# Patient Record
Sex: MALE | Race: WHITE
[De-identification: names, ages, dates, MRNs, and addresses within clinical notes are randomized per-mention and may not be internally consistent; named-entity substitution may affect disease eponyms.]

---

## 2021-01-02 ENCOUNTER — HOSPITAL ENCOUNTER (EMERGENCY)
Dept: HOSPITAL 97 - ER | Age: 71
Discharge: LEFT BEFORE DISCHARGE | End: 2021-01-02
Payer: COMMERCIAL

## 2021-01-02 VITALS — SYSTOLIC BLOOD PRESSURE: 145 MMHG | OXYGEN SATURATION: 99 % | TEMPERATURE: 98.3 F | DIASTOLIC BLOOD PRESSURE: 66 MMHG
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
## 2021-01-02 DIAGNOSIS — M62.82: ICD-10-CM

## 2021-01-02 DIAGNOSIS — F17.290: ICD-10-CM

## 2021-01-02 DIAGNOSIS — Z23: ICD-10-CM

## 2021-01-02 DIAGNOSIS — S01.111A: Primary | ICD-10-CM
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
## 2021-01-02 DIAGNOSIS — S43.004A: ICD-10-CM

## 2021-01-02 DIAGNOSIS — F10.10: ICD-10-CM
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
## 2021-01-02 DIAGNOSIS — I10: ICD-10-CM

## 2021-01-02 LAB
ALBUMIN SERPL BCP-MCNC: 4.2 G/DL (ref 3.4–5)
ALP SERPL-CCNC: 81 U/L (ref 45–117)
ALT SERPL W P-5'-P-CCNC: 36 U/L (ref 12–78)
AST SERPL W P-5'-P-CCNC: 37 U/L (ref 15–37)
BLD SMEAR INTERP: (no result)
BUN BLD-MCNC: 12 MG/DL (ref 7–18)
GLUCOSE SERPLBLD-MCNC: 222 MG/DL (ref 74–106)
HCT VFR BLD CALC: 42.1 % (ref 39.6–49)
INR BLD: 1.13
LYMPHOCYTES # SPEC AUTO: 0.4 K/UL (ref 0.7–4.9)
MAGNESIUM SERPL-MCNC: 1.9 MG/DL (ref 1.8–2.4)
MORPHOLOGY BLD-IMP: (no result)
NT-PROBNP SERPL-MCNC: 60 PG/ML (ref ?–125)
PMV BLD: 9.4 FL (ref 7.6–11.3)
POTASSIUM SERPL-SCNC: 3.6 MMOL/L (ref 3.5–5.1)
RBC # BLD: 4.66 M/UL (ref 4.33–5.43)
TROPONIN (EMERG DEPT USE ONLY): < 0.02 NG/ML (ref 0–0.04)

## 2021-01-02 PROCEDURE — 99284 EMERGENCY DEPT VISIT MOD MDM: CPT

## 2021-01-02 PROCEDURE — 36415 COLL VENOUS BLD VENIPUNCTURE: CPT

## 2021-01-02 PROCEDURE — 85610 PROTHROMBIN TIME: CPT

## 2021-01-02 PROCEDURE — 73060 X-RAY EXAM OF HUMERUS: CPT

## 2021-01-02 PROCEDURE — 93005 ELECTROCARDIOGRAM TRACING: CPT

## 2021-01-02 PROCEDURE — 96360 HYDRATION IV INFUSION INIT: CPT

## 2021-01-02 PROCEDURE — 80048 BASIC METABOLIC PNL TOTAL CA: CPT

## 2021-01-02 PROCEDURE — 83735 ASSAY OF MAGNESIUM: CPT

## 2021-01-02 PROCEDURE — 12013 RPR F/E/E/N/L/M 2.6-5.0 CM: CPT

## 2021-01-02 PROCEDURE — 70450 CT HEAD/BRAIN W/O DYE: CPT

## 2021-01-02 PROCEDURE — 80076 HEPATIC FUNCTION PANEL: CPT

## 2021-01-02 PROCEDURE — 73030 X-RAY EXAM OF SHOULDER: CPT

## 2021-01-02 PROCEDURE — 80320 DRUG SCREEN QUANTALCOHOLS: CPT

## 2021-01-02 PROCEDURE — 83880 ASSAY OF NATRIURETIC PEPTIDE: CPT

## 2021-01-02 PROCEDURE — 96361 HYDRATE IV INFUSION ADD-ON: CPT

## 2021-01-02 PROCEDURE — 84484 ASSAY OF TROPONIN QUANT: CPT

## 2021-01-02 PROCEDURE — 71045 X-RAY EXAM CHEST 1 VIEW: CPT

## 2021-01-02 PROCEDURE — 0JQ10ZZ REPAIR FACE SUBCUTANEOUS TISSUE AND FASCIA, OPEN APPROACH: ICD-10-PCS

## 2021-01-02 PROCEDURE — 85025 COMPLETE CBC W/AUTO DIFF WBC: CPT

## 2021-01-02 PROCEDURE — 82550 ASSAY OF CK (CPK): CPT

## 2021-01-02 NOTE — RAD REPORT
EXAM DESCRIPTION:  RAD - Shoulder  Right 2 View - 1/2/2021 12:43 pm

 

CLINICAL HISTORY:  Right shoulder pain

 

FINDINGS:  Anterior right humeral dislocation.

 

No fracture is seen

## 2021-01-02 NOTE — XMS REPORT
Clinical Summary

                           Created on:2021



Patient:Ramírez Raza

Sex:Male

:1950

External Reference #:[id number]





Demographics







                          Address                   [address]



                                                    [place] [address]

 

                          Home Phone                [phone]

 

                          Preferred Language        English

 

                          Marital Status            Unknown

 

                          Islam Affiliation     Unknown

 

                          Race                      White

 

                          Ethnic Group              Not  or 









Author







                          Organization              [facility]

 

                          Address                   [address] [place] [address]









Support







                Name            Relationship    Address         Phone

 

                Adriana Raza  Unavailable     [address] [phone]



                                                [place] [address] 









Care Team Providers







                    Name                Role                Phone

 

                    Luis Thrasher MD Primary Care Provider [phone]









Allergies

No Known Allergies



Medications







          Medication Sig       Dispensed Refills   Start Date End Date  Status

 

          SITagliptin-metFORMIN Take 1 tablet by           0                    

         Active



          (JANUMET)  mg mouth 2 (two) times                               

          



          per tablet daily with                                         



                    breakfast and                                         



                    dinner.                                           

 

          losartan (COZAAR) 100 Take 100 mg by           0                      

       Active



          MG tablet mouth daily.                                         

 

          albuterol HFA Inhale 4 puffs by           0                           

  Active



          (VENTOLIN HFA) 90 mouth via inhaler                                   

      



          mcg/actuation inhaler every 6 (six) hours                             

            



                    as needed for                                         



                    Wheezing.                                         

 

          glimepiride (AMARYL) Take 4 mg by mouth           0                   

          Active



          4 MG tablet 2 (two) times daily                                       

  



                    before meals.                                         







Active Problems







                          Problem                   Noted Date

 

                          Back pain                 2017

 

                          Stress                    2017

 

                          ICH (intracerebral hemorrhage) 2017

 

                          Thalamic hemorrhage       2017

 

                          Uncontrolled hypertension 2017

 

                          Type 2 diabetes mellitus  2017







Social History







             Tobacco Use  Types        Packs/Day    Years Used   Date

 

             Former Smoker                                        









                Smokeless Tobacco: Never Used                                 









                Alcohol Use     Drinks/Week     oz/Week         Comments

 

                No                                              









                          Sex Assigned at Birth     Date Recorded

 

                          Not on file               







Last Filed Vital Signs

Not on file



Plan of Treatment

Not on file



Results

Not on fileafter 2020



Insurance







          Payer     Benefit Plan / Subscriber ID Effective Phone     Address   T

ype



                    Group               Dates                         

 

          MEDICARE  MEDICARE A B [id number] 2015-[id number]                     M

edicare



                                        nt                            

 

          BLUE      BCBS INDEMNITY [id number] 2015-Erna [phone] [address]

    [address]



           [place]                 [id number]



                                        



          [place] 



                                                            [id number] 









           Guarantor Name Account Type Relation to Date of Birth Phone      Bill

ing



                                 Patient                          Address

 

           Ramírez Raza Personal/Family Self       1950 [phone]



                                                       (Home)     [place] [address] [place] [address]







Advance Directives

For more information, please contact: [phone]





                Code Status     Date Activated  Date Inactivated Comments

 

                Full Code       2017  4:52 PM 2017  2:25 PM 









                    This code status was determined by: Patient

## 2021-01-02 NOTE — XMS REPORT
Continuity of Care Document

                           Created on:2021



Patient:Ramírez Raza

Sex:Male

:1950

External Reference #:[id number]





Demographics







                          Address                   [address] [place] [address]

 

                          Home Phone                [phone]

 

                          Preferred Language        en-US

 

                          Marital Status            Unknown

 

                          Yarsanism Affiliation     Unknown

 

                          Race                      Unknown

 

                          Ethnic Group              Unknown









Author







                          Organization              [employer]









Care Team Providers







                    Name                Role                Phone

 

                    [employer] Unavailable         Un

available









Problems







          Problem   Status    Onset     Classification Date      Comments  Sourc

e



                              Date                Reported            



                                                                      



                                                                      

 

          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



          tumor of                                                    Mac



          tendon                                                      



          sheath                                                      



                                                                      



                                                                      

 

          Ganglion  Active              Problem   10/05/2016           [facility]na



          cyst of                                                     Mac



          finger of                                                   



          left hand                                                   



                                                                      



                                                                      







Medications







        Medication Details Route   Status  Patient Ordering Order   Source



                                        Instructions Provider Date    



                                                                



                                                                



                                                                

 

        simvastatin 1 tab(s) orally  Active  10 mg orally MAC eRa

[place]



                                        once a day (at                 [place]



                                        bedtime)                 



                                                                



                                                                

 

        Janumet 1 tab(s) orally  Active  500 mg-50 mg MAC Olivat

e



                                        orally 2 times                 Mac



                                        a day                   



                                                                

 

        losartan 1 tab(s) orally  Active  100 mg orally MAC Realo

tte



                                        once a day                 Mac



                                                                



                                                                

 

        glimepiride 1 tab(s) orally  Active  2 mg orally MAC Real

jennifer



                                        once a day                 Mac



                                                                



                                                                

 

        Invokana 1 tab(s) orally  Active  300 mg orally MAC Realo

tte



                                        once a day                 Mac



                                                                



                                                                

 

        Plavix  1 tab(s) orally  Active  75 mg orally MAC Olivat

e



                                        once a day                 Mac



                                                                



                                                                







Allergies, Adverse Reactions, Alerts







        Substance Category Reaction Severity Reaction Status  Date    Comments S

ource



                                        type            Reported         



                                                                        



                                                                        



                                                                        

 

        N.K.D.A. Adverse Info Not         Adverse Active  10/03/201         Rona roberts



                Reaction Available         Reaction         6               Jhonatan

howard



                                                                        



                                                                        



                                                                        







Immunizations







                                        No Data Provided for This Section







Results







                                        No Data Provided for This Section







Pathology Reports







                                        No Data Provided for This Section







Diagnostic Reports







                                        No Data Provided for This Section







Consultation Notes







                                        No Data Provided for This Section







Discharge Summaries







                                        No Data Provided for This Section







History and Physicals







                                        No Data Provided for This Section







Vital Signs







             Vital Sign   Value        Date         Comments     Source



                                                                 

 

             Weight       195          10/03/2016                Edna Overton

nder



                                                                 



                                                                 

 

             Diastolic (mm Hg) 79           10/03/2016                Ednaarmando Pulliam



                                                                 



                                                                 

 

             Systolic (mm Hg) 134          10/03/2016                Edna carmona



                                                                 



                                                                 







Encounters







       Location Location Encounter Encounter Reason Attending ADM    MS     Stat

 Source



              Details Type   Number For    Provider Date   Date          



                                   Visit                              



                                                                      



                                                                      



                                                                      

 

       Edna JASSO        TUMOR  [id number]               10/03  10/03         C

javier Pulliam        THUMB  [id number]         Nicole love MD                   [id number]                                    



                            [id number]                                    



                                                                      



                                                                      







Procedures







                                        No Data Provided for This Section







Assessment and Plan







                                        No Data Provided for This Section







Plan of Care







                                        No Data Provided for This Section







Social History







                    Social History      Date                Source



                                                            

 

                    Social History ElementQualifiersDate Reported 10/03/2016    

      Edna Pulliam



                    Pneumoccal Vaccine                      



                    .  Have you had a Pneumoccal Vaccine No                     



                    Oct 03, 2016                            



                    Do you live alone?                      



                    .  No                                   



                    Oct 03, 2016                            



                    Flu Shot:                               



                    .  Yes                                  



                    Oct 03, 2016                            



                    Mammogram in last year:                     



                    .  N/A                                  



                    Oct 03, 2016                            



                    Dominant Hand                           



                    .  What is your dominant hand Right                     



                    Oct 03, 2016                            



                    Bone Density:                           



                    .  Have you had a bone density test?  When No               

      



                    Oct 03, 2016                            



                    Caretaker of an elderly or disabled person:                 

    



                    .  No                                   



                    Oct 03, 2016                            



                    Had a fall in the last year:                     



                    .  Have you had a fall in the last year? No                 

    



                    Oct 03, 2016                            



                    Marital Status                          



                    .  Status                        



                    Oct 03, 2016                            



                    Tobacco use                             



                    .  Status: Current, What type? Cigars                     



                    Oct 03, 2016                            



                    alcohol                                 



                    .  Alcohol Seldom                       



                    Oct 03, 2016                            



                                                            







Family History







                    Value               Date                Source



                                                            

 

                    QualifierDescriptionCommentDate Reported 10/05/2016         

 Edna Pulliam



                    Mother                                  



                    alive                                   



                    Comment not available                     



                    Oct 03, 2016                            



                    Children                                



                    Comment not available                     



                    Oct 03, 2016                            



                    How many brothers                       



                    Comment not available                     



                    Oct 03, 2016                            



                    Father                                  



                                                    



                    colon cancer                            



                    Oct 03, 2016                            



                    How many sisters                        



                    Comment not available                     



                    Oct 03, 2016                            



                    Children boys                           



                    Comment not available                     



                    Oct 03, 2016                            



                    Siblings                                



                    Comment not available                     



                    Oct 03, 2016                            



                    Children girls                          



                    Comment not available                     



                    Oct 03, 2016                            



                    Other                                   



                    Comment not available                     



                    Oct 03, 2016                            



                                                            







Advance Directives







                                        No Data Provided for This Section







Functional Status







                                        No Data Provided for This Section

## 2021-01-02 NOTE — ER
Nurse's Notes                                                                                     

 [facility]                                                                 

Name: Ramírez Raza                                                                              

Age: 70 yrs                                                                                       

Sex: Male                                                                                         

: 1950                                                                                   

MRN: [id number]                                                                                   

Arrival Date: 2021                                                                          

Time: 09:55                                                                                       

Account#: [id number]                                                                            

Bed 20                                                                                            

Private MD:                                                                                       

Diagnosis: Alcohol abuse;Fall on same level from slipping, tripping and stumbling;Unspecified     

  injury of head;Laceration without foreign body of right eyelid and periocular                   

  area;Unspecified dislocation of right shoulder joint;Rhabdomyolysis                             

                                                                                                  

Presentation:                                                                                     

                                                                                             

10:06 Chief complaint: Patient states: Awoke passed out in his hallway this morning. Cant     ll1 

      remember when he fell. Thinks he might have tripped over his wife's workout equipment       

      in the hallway. + LOC. Laceration R eyebrow, abrasion to nose. RUE pain with movement.      

      Coronavirus screen: Client denies travel out of the [place] in the last 14 days. At this       

      time, the client does not indicate any symptoms associated with coronavirus-19. Ebola       

      Screen: Patient denies travel to an Ebola-affected area in the 21 days before illness       

      onset. Mechanism of Injury: The problem was sustained at home, resulted from a fall.        

      Initial Sepsis Screen: Does the patient meet any 2 criteria? No. Patient's initial          

      sepsis screen is negative. Does the patient have a suspected source of infection? Yes:      

      Skin breakdown/wound. Risk Assessment: Do you want to hurt yourself or someone else?        

      Patient reports no desire to harm self or others. Onset of symptoms was 2021.    

10:06 Method Of Arrival: Ambulatory                                                           ll1 

10:06 Acuity: SAL 2                                                                           ll1 

                                                                                                  

Triage Assessment:                                                                                

15:00 General: Appears in no apparent distress. Behavior is calm, cooperative, appropriate    ll1 

      for age. Pain: Complains of pain in right shoulder Quality of pain is described as          

      aching, Aggravated by increased activity.                                                   

15:00 Neuro: Level of Consciousness is awake, alert, Oriented to person, place, time,         ll1 

      situation, Appropriate for age  are equal bilaterally Moves all extremities. Full      

      function Gait is steady, Speech is normal, Facial symmetry appears normal, Reports a        

      syncopal episode Denies dizziness, headache.                                                

                                                                                                  

Historical:                                                                                       

- Allergies:                                                                                      

10:09 No Known Allergies;                                                                     ll1 

- PMHx:                                                                                           

10:09 hemorrhoids; High Cholesterol; Hypertension; Diabetes - NIDDM;                          ll1 

- PSHx:                                                                                           

10:09 hemorrhoids;                                                                            ll1 

                                                                                                  

- Immunization history:: Flu vaccine is not up to date.                                           

- Social history:: Smoking status: Patient reports the use of cigarette tobacco                   

  products, denies chronic smoking, but will smoke occasionally, cigars.                          

                                                                                                  

                                                                                                  

Screening:                                                                                        

10:09 Abuse screen: Denies threats or abuse. Nutritional screening: No deficits noted.        ll1 

      Tuberculosis screening: No symptoms or risk factors identified. Fall Risk Fall in past      

      12 months (25 points). IV access (20 points). Mental Status- Overestimates/Forgets          

      Limitations (15 pts.). Total Michelle Fall Scale indicates High Risk Score (45 or more         

      points). Fall prevention measures have been instituted. Side Rails Up X 2 Frequent          

      Obs/Assessments Occuring As available patient and family educated on Fall Prevention        

      Program and Strategies.                                                                     

                                                                                                  

Assessment:                                                                                       

10:06 General: Appears uncomfortable, Behavior is calm, cooperative, appropriate for age.     ll1 

      Pain: Complains of pain in RUE Quality of pain is described as aching, Pain began 1 day     

      ago. Aggravated by increased activity. Neuro: Level of Consciousness is awake, alert,       

      obeys commands, Oriented to person, place, time, situation, Appropriate for age        

      are equal bilaterally Moves all extremities. Full function Gait is steady, Speech is        

      normal, Facial symmetry appears normal, Reports a syncopal episode. Cardiovascular: No      

      deficits noted. Respiratory: No deficits noted. GI: No deficits noted. Derm: 2 <2 cm        

      lacerations to R eyebrow, dried blood noticed all over face. Musculoskeletal:               

      Circulation, motion, and sensation intact. Capillary refill < 3 seconds, Range of           

      motion: limited in right shoulder Tenderness present in R shoulder Reports pain in R        

      shoulder. Injury Description: Head injury Bruise trip and fall with + LOC.                  

11:00 Reassessment: No changes from previously documented assessment. Patient and/or family   ll1 

      updated on plan of care and expected duration. Pain level reassessed.                       

12:00 Reassessment: No changes from previously documented assessment. Patient and/or family   ll1 

      updated on plan of care and expected duration. Pain level reassessed. Patient is alert,     

      oriented x 3, equal unlabored respirations, skin warm/dry/pink.                             

13:00 Reassessment: No changes from previously documented assessment. Patient and/or family   ll1 

      updated on plan of care and expected duration. Pain level reassessed.                       

14:00 Reassessment: No changes from previously documented assessment. Patient and/or family   ll1 

      updated on plan of care and expected duration. Pain level reassessed. Patient is alert,     

      oriented x 3, equal unlabored respirations, skin warm/dry/pink.                             

15:00 Reassessment: No changes from previously documented assessment. Patient and/or family   ll1 

      updated on plan of care and expected duration. Pain level reassessed. Patient is alert,     

      oriented x 3, equal unlabored respirations, skin warm/dry/pink.                             

                                                                                                  

Vital Signs:                                                                                      

10:06  / 66; Pulse 85; Resp 18; Temp 98.3; Pulse Ox 99% on R/A; Weight 90.72 kg; Height ll1 

      5 ft. 10 in. (177.80 cm); Pain 6/10;                                                        

11:00  / 61; Pulse 80; Resp 17; Pulse Ox 99% ;                                          ll1 

13:00  / 61; Pulse 80; Resp 17; Pulse Ox 99% ;                                          ll1 

15:00  / 60; Pulse 78; Resp 17; Pulse Ox 99% on R/A; Pain 3/10;                         ll1 

10:06 Body Mass Index 28.70 (90.72 kg, 177.80 cm)                                             ll1 

                                                                                                  

Gavin Coma Score:                                                                               

10:06 Eye Response: spontaneous(4). Verbal Response: oriented(5). Motor Response: obeys       ll1 

      commands(6). Total: 15.                                                                     

                                                                                                  

ED Course:                                                                                        

09:55 Patient arrived in ED.                                                                  ll1 

10:01 Hair Gomes NP is PHCP.                                                           pm1 

10:01 Cruz Tineo MD is Attending Physician.                                             pm1 

10:05 Dalia Chu RN is Primary Nurse.                                                     ll1 

10:08 Triage completed.                                                                       ll1 

10:09 Arm band placed on Patient placed in an exam room, on a stretcher.                      ll1 

10:10 Patient has correct armband on for positive identification. Bed in low position. Call   ll1 

      light in reach. Side rails up X2. Pulse ox on. NIBP on.                                     

10:10 Inserted saline lock: 22 gauge in left antecubital area, using aseptic technique. Blood ll1 

      collected.                                                                                  

10:46 XRAY Chest (1 view) In Process Unspecified.                                             EDMS

10:46 Humerus Right XRAY In Process Unspecified.                                              EDMS

10:46 Shoulder Right (2 View) XRAY In Process Unspecified.                                    EDMS

10:54 CT Head Brain wo Cont In Process Unspecified.                                           EDMS

12:44 Shoulder Right (2 View) XRAY In Process Unspecified.                                    EDMS

15:00 Assist provider with reduction. IV discontinued, intact, bleeding controlled, No        ll1 

      redness/swelling at site. Pressure dressing applied.                                        

                                                                                                  

Administered Medications:                                                                         

10:20 Not Given (UTD): Tetanus-Diphtheria Toxoid Adult 0.5 ml IM once                         ll1 

10:46 Drug: NS 0.9% 1000 ml Route: IV; Rate: 1000 ml; Site: left antecubital;                 ll1 

14:12 Follow up: Response: No adverse reaction; IV Status: Completed infusion; IV Intake:     ll1 

      1000ml                                                                                      

13:13 Drug: Lidocaine (1 %) 5 ml Volume: 5 ml; Route: Infiltration;                           ll1 

15:25 Follow up: Response: No adverse reaction; RASS: Alert and Calm (0)                      ll1 

                                                                                                  

                                                                                                  

Intake:                                                                                           

14:12 IV: 1000ml; Total: 1000ml.                                                              ll1 

                                                                                                  

Outcome:                                                                                          

15:05 Patient left the ED.                                                                    ll1 

15:05 Discharged to home ambulatory.                                                          ll1 

15:05 AMA AMA form signed                                                                         

15:05 Condition: stable                                                                           

15:05 Discharge instructions given to patient, Instructed on                                      

15:05 Instructed on discharge instructions, follow up and referral plans. medication usage,       

      AMA instruction Demonstrated understanding of instructions, follow-up care,                 

      medications, Prescriptions given X 1.                                                       

                                                                                                  

Signatures:                                                                                       

Dispatcher [employer]                           EDMS                                                 

Hair Gomes NP                    NP   pm1                                                  

Dalia Chu RN                       RN   ll1                                                  

                                                                                                  

**************************************************************************************************

## 2021-01-02 NOTE — RAD REPORT
EXAM DESCRIPTION:  RAD - Shoulder  Right 2 View - 1/2/2021 10:46 am

 

CLINICAL HISTORY:  Right shoulder pain

 

FINDINGS:  No fracture is seen. Anterior humeral dislocation

## 2021-01-02 NOTE — EDPHYS
Physician Documentation                                                                           

 [facility]                                                                 

Name: Ramírez Raza                                                                              

Age: 70 yrs                                                                                       

Sex: Male                                                                                         

: 1950                                                                                   

MRN: [id number]                                                                                   

Arrival Date: 2021                                                                          

Time: 09:55                                                                                       

Account#: [id number]                                                                            

Bed 20                                                                                            

Private MD:                                                                                       

ED Physician Cruz Tineo                                                                      

HPI:                                                                                              

                                                                                             

10:11 This 70 yrs old  Male presents to ER via Ambulatory with complaints of Closed  pm1 

      Head Injury-Adult.                                                                          

10:11 The patient's problem is reported as syncope. Onset: The symptoms/episode               pm1 

      began/occurred last night. Duration: This was a single incident. Context: occurred at       

      home, occurred while the patient was walking, Possible contributing factors include:        

      Patient is known to have ingested ETOH: Beer last night. Associated signs and symptoms:     

      Pertinent positives: Laceration to right eyebrow. Right biceps pain. Severity of            

      symptoms: in the emergency department the symptoms are unchanged. Patient's baseline:       

      Neuro: alert and fully oriented, Motor: no deficits, Ambulation: walks without              

      assistance, The patient has a previous history of CVA.                                      

                                                                                                  

Historical:                                                                                       

- Allergies:                                                                                      

10:09 No Known Allergies;                                                                     ll1 

- PMHx:                                                                                           

10:09 hemorrhoids; High Cholesterol; Hypertension; Diabetes - NIDDM;                          ll1 

- PSHx:                                                                                           

10:09 hemorrhoids;                                                                            ll1 

                                                                                                  

- Immunization history:: Flu vaccine is not up to date.                                           

- Social history:: Smoking status: Patient reports the use of cigarette tobacco                   

  products, denies chronic smoking, but will smoke occasionally, cigars.                          

                                                                                                  

                                                                                                  

ROS:                                                                                              

10:11 Constitutional: Negative for fever, chills, and weight loss, Neck: Negative for injury, pm1 

      pain, and swelling, Cardiovascular: Negative for chest pain, palpitations, and edema,       

      Respiratory: Negative for shortness of breath, cough, wheezing, and pleuritic chest         

      pain, Abdomen/GI: Negative for abdominal pain, nausea, vomiting, diarrhea, and              

      constipation, Back: Negative for injury and pain.                                           

10:11 MS/extremity: Positive for pain, of the right bicep.                                        

10:11 Skin: Positive for laceration(s), of the middle aspect of right eyebrow.                    

10:11 Neuro: Positive for loss of consciousness, syncope, Negative for numbness, tingling,        

      weakness.                                                                                   

                                                                                                  

Exam:                                                                                             

11:31 Radiologist reports: Negative for acute findings                                        pm1 

11:31 Constitutional:  This is a well developed, well nourished patient who is awake, alert,      

      and in no acute distress.                                                                   

11:31 Neck:  Trachea midline, no thyromegaly or masses palpated, and no cervical                  

      lymphadenopathy.  Supple, full range of motion without nuchal rigidity, or vertebral        

      point tenderness.  No Meningismus. Chest/axilla:  Normal chest wall appearance and          

      motion.  Nontender with no deformity.  No lesions are appreciated.                          

11:31 Skin:  Warm, dry with normal turgor.  Normal color with no rashes, no lesions, and no       

      evidence of cellulitis. MS/ Extremity:  Pulses equal, no cyanosis.  Neurovascular           

      intact.  Full, normal range of motion.                                                      

11:31 Head/face: Noted is no obvious of injury or deformity except abrasion(s), that are          

      mild, of the  nose, a laceration(s), that is jagged, 3 cm(s), of the  middle aspect of      

      right eyebrow.                                                                              

11:31 Eyes: Pupils: no acute changes, Extraocular movements: no acute changes, Conjunctiva:       

      normal.                                                                                     

11:31 Cardiovascular: Exam negative for  acute changes, Rate: normal, Rhythm: regular,            

      Pulses: no pulse deficits are appreciated, Edema: is not appreciated.                       

11:31 Respiratory: Exam negative for  acute changes, respiratory distress, shortness of           

      breath.                                                                                     

11:31 Abdomen/GI: Exam negative for acute changes, Inspection: abdomen appears normal,            

      Palpation: abdomen is soft and non-tender, in all quadrants.                                

11:31 Back: Exam negative for acute changes, pain, is absent, normal spinal alignment noted.      

11:31 Neuro: Exam negative for acute changes, Orientation: is normal, Mentation: is normal,       

      Motor: is normal, moves all fours, Sensation: is normal, no obvious gross deficits.         

                                                                                                  

Vital Signs:                                                                                      

10:06  / 66; Pulse 85; Resp 18; Temp 98.3; Pulse Ox 99% on R/A; Weight 90.72 kg; Height ll1 

      5 ft. 10 in. (177.80 cm); Pain 6/10;                                                        

11:00  / 61; Pulse 80; Resp 17; Pulse Ox 99% ;                                          ll1 

13:00  / 61; Pulse 80; Resp 17; Pulse Ox 99% ;                                          ll1 

15:00  / 60; Pulse 78; Resp 17; Pulse Ox 99% on R/A; Pain 3/10;                         ll1 

10:06 Body Mass Index 28.70 (90.72 kg, 177.80 cm)                                             ll1 

                                                                                                  

Gavin Coma Score:                                                                               

10:06 Eye Response: spontaneous(4). Verbal Response: oriented(5). Motor Response: obeys       ll1 

      commands(6). Total: 15.                                                                     

                                                                                                  

Procedures:                                                                                       

14:08 Reduction: of the right shoulder, using traction, Immobilized with shoulder             pm1 

      immobilizer. Patient tolerated well.                                                        

                                                                                                  

MDM:                                                                                              

10:05 Patient medically screened.                                                             [place] 

11:44 Data reviewed: vital signs.                                                             pm1 

12:15 Data interpreted: Pulse oximetry: on room air is 99 %. Interpretation: normal.          pm1 

12:15 Counseling: I had a detailed discussion with the patient and/or guardian regarding: the pm1 

      historical points, exam findings, and any diagnostic results supporting the                 

      discharge/admit diagnosis, lab results, radiology results, the need for further work-up     

      and treatment in the hospital.                                                              

12:15 Refusal of service: The patient/guardian displays adequate decision making capability   pm1 

      and despite a detailed discussion of alternatives, benefits, risks, and consequences        

      refuses: Admission to the hospital for further work-up and treatment, Patient does not      

      want tot stay in the hospital because he is concerned about his wife and would like to      

      be at the house when she gets home.                                                         

14:08 ED course: Right shoulder reduced by Dr. Tineo. would like patient in shoulder       pm1 

      immobilizer and CT scan right shoulder.                                                     

14:50 Refusal of service: The patient/guardian displays adequate decision making capability   pm1 

      and despite a detailed discussion of alternatives, benefits, risks, and consequences        

      refuses: CT Scan, Patient said that he would like to go home now. His right shoulder        

      feels better and he does not want to wait for a CT scan of his shoulder. Explained to       

      the patient again that I would like to keep him in the hospital for further treatment       

      and evaluation but he does not want to stay.                                                

14:55 ED course: Offered to talk to patient's wife on the phone to update her on findings and pm1 

      diagnosis but the patient does not want to wait. He said that he is ready to go home        

      now.                                                                                        

                                                                                                  

                                                                                             

10:09 Order name: Basic Metabolic Panel; Complete Time: 11:43                                 pm1 

                                                                                             

10:09 Order name: CBC with Diff; Complete Time: 13:06                                         pm1 

                                                                                             

10:09 Order name: LFT's; Complete Time: 11:44                                                 pm1 

                                                                                             

10:09 Order name: Magnesium; Complete Time: 11:44                                             pm1 

                                                                                             

10:09 Order name: NT PRO-BNP; Complete Time: 11:44                                            pm1 

                                                                                             

10:09 Order name: PT-INR; Complete Time: 10:49                                                pm1 

                                                                                             

10:09 Order name: CT Head Brain wo Cont; Complete Time: 11:31                                 pm1 

                                                                                             

10:09 Order name: Troponin (emerg Dept Use Only); Complete Time: 11:44                        pm                                                                                             

10:09 Order name: XRAY Chest (1 view); Complete Time: 11:31                                   pm                                                                                             

10:11 Order name: CPK; Complete Time: 11:44                                                   pm1 

                                                                                             

10:14 Order name: Humerus Right XRAY; Complete Time: 11:31                                    pm                                                                                             

10:14 Order name: Shoulder Right (2 View) XRAY; Complete Time: 11:31                          pm1 

                                                                                             

12:12 Order name: ETOH Level; Complete Time: 13:36                                            pm                                                                                             

12:34 Order name: CBC Smear Scan; Complete Time: 13:06                                        EDMS

                                                                                             

10:09 Order name: EKG; Complete Time: 10:11                                                   pm                                                                                             

10:09 Order name: Cardiac monitoring; Complete Time: 10:54                                    pm1 

                                                                                             

10:09 Order name: EKG - Nurse/Tech; Complete Time: 10:54                                      pm1 

                                                                                             

10:09 Order name: IV Saline Lock; Complete Time: 10:10                                        pm                                                                                             

10:09 Order name: Labs collected and sent; Complete Time: 10:10                               pm                                                                                             

10:09 Order name: O2 Per Protocol; Complete Time: 10:10                                       pm                                                                                             

10:09 Order name: O2 Sat Monitoring; Complete Time: 10:10                                     pm                                                                                             

10:12 Order name: Prolene, Sutures; Complete Time: 13:14                                      pm                                                                                             

10:12 Order name: Dressing - Wound; Complete Time: 15:25                                      pm                                                                                             

10:12 Order name: Gloves, Sterile; Complete Time: 13:14                                       pm1 

                                                                                             

12:13 Order name: Shoulder Right (2 View) XRAY; Complete Time: 13:36                          pm                                                                                             

10:12 Order name: Setup Suture Tray; Complete Time: 13:14                                     pm1 

                                                                                             

14:09 Order name: Shoulder Immobilizer; Complete Time: 14:11                                  pm1 

                                                                                                  

Administered Medications:                                                                         

10:20 Not Given (UTD): Tetanus-Diphtheria Toxoid Adult 0.5 ml IM once                         ll1 

10:46 Drug: NS 0.9% 1000 ml Route: IV; Rate: 1000 ml; Site: left antecubital;                 1 

14:12 Follow up: Response: No adverse reaction; IV Status: Completed infusion; IV Intake:     ll1 

      1000ml                                                                                      

13:13 Drug: Lidocaine (1 %) 5 ml Volume: 5 ml; Route: Infiltration;                           ll1 

15:25 Follow up: Response: No adverse reaction; RASS: Alert and Calm (0)                      1 

                                                                                                  

                                                                                                  

Disposition:                                                                                      

                                                                                             

07:21 Co-signature as Attending Physician, Cruz Tineo MD I agree with the assessment and  [place] 

      plan of care.                                                                               

                                                                                                  

Disposition:                                                                                      

21 14:53 Patient has left against medical advice. Impression: Rhabdomyolysis, Alcohol       

  abuse, Fall on same level from slipping, tripping and stumbling, Unspecified                    

  injury of head, Laceration without foreign body of right eyelid and                             

  periocular area, Unspecified dislocation of right shoulder joint. - Patients                    

  states they are going to Home.                                                                  

- Condition is Undetermined.                                                                      

- Discharge Instructions: Shoulder Dislocation, Head Injury, Adult, Fall Prevention in            

  the Home, Facial Laceration, Rhabdomyolysis, Alcohol Abuse and Nutrition, How to Use            

  a Sling.                                                                                        

- Prescriptions for Keflex 500 mg Oral Capsule - take 1 capsule by ORAL route every 12            

  hours for 10 days; 20 capsule.                                                                  

                                                                                                  

Follow up: Emergency Department; When: As needed; Reason: Worsening of condition.                 

  Follow up: Private Physician; When: 2 - 3 days; Reason: Recheck today's complaints,             

  Continuance of care, Re-evaluation by your physician.                                           

- Problem is new.                                                                                 

- Symptoms have improved.                                                                         

- Notes: Suture reemoval in 4-5 days                                                              

                                                                                                  

                                                                                                  

Signatures:                                                                                       

Dispatcher [employer]                           EDCruz Sandy MD MD cha Marinas, Patrick, NP                    NP   pm1                                                  

Dalia Chu RN                       RN   ll1                                                  

                                                                                                  

Corrections: (The following items were deleted from the chart)                                    

                                                                                             

14:12 12:13 Sling ordered. pm1                                                                ll1 

14:55 14:08 CT RIGHT SHOULDER W/O CONTRAST ordered. EDMS                                      EDMS

15:05 14:53 2021 14:53 Patients has left against medical advice. Impression:            ll1 

      RhabdomyolysisAlcohol abuse; Fall on same level from slipping, tripping and stumbling;      

      Unspecified injury of head; Laceration without foreign body of right eyelid and             

      periocular area; Unspecified dislocation of right shoulder joint. Patient states they       

      are going to Home. Condition is Undetermined. Follow up: Emergency Department; When: As     

      needed; Reason: Worsening of condition. Follow up: Private Physician; When: 2 - 3 days;     

      Reason: Recheck today's complaints, Continuance of care, Re-evaluation by your              

      physician. Problem is new. Symptoms have improved. pm1                                      

                                                                                                  

**************************************************************************************************

## 2021-01-02 NOTE — RAD REPORT
EXAM DESCRIPTION:  CT - Head Brain Wo Cont - 1/2/2021 10:54 am

 

CLINICAL HISTORY:  Head injury status post fall. Headache

 

COMPARISON:  2019

 

TECHNIQUE:  Computed axial tomography of the head was obtained. IV contrast was not requested.

 

All CT scans are performed using dose optimization technique as appropriate and may include automated
 exposure control or mA/KV adjustment according to patient size.

 

FINDINGS:

Right supraorbital swelling.

 

An intracranial  bleed is not seen .

 

The ventricles are normal in caliber.

 

No extra-axial fluid collection is noted.

 

Mild to moderate low-density areas within periventricular, deep and subcortical white matter likely r
epresent ischemic changes secondary to small vessel disease.

 

Fluid within the sinuses/ mastoids is not seen.

 

IMPRESSION:  No acute intracranial abnormality is seen. If patient's symptoms persist  MRI of the bra
in would be recommended.

## 2021-01-02 NOTE — RAD REPORT
EXAM DESCRIPTION:  Sarah Single View1/2/2021 10:46 am

 

CLINICAL HISTORY:  Chest pain

 

COMPARISON:  2017

 

FINDINGS:   The lungs appear clear of acute infiltrate. The heart is normal size.

 

Anterior right humeral head dislocation

## 2021-01-02 NOTE — RAD REPORT
EXAM DESCRIPTION:  RAD - Humerus Right - 1/2/2021 10:46 am

 

CLINICAL HISTORY:   Right arm pain status post fall

 

FINDINGS:   No fracture is seen. Anterior humeral head dislocation

## 2021-01-02 NOTE — XMS REPORT
Summary of Care

                           Created on:December 3, 2020



Patient:Ramírez Raza

Sex:Male

:1950

External Reference #:[id number]





Demographics







                          Address                   [address]



                                                    [place] [address]

 

                          Home Phone                [phone]

 

                          Mobile Phone              [phone]

 

                          Email Address             [email or web address]

 

                          Preferred Language        English

 

                          Marital Status            

 

                          Anabaptism Affiliation     Unknown

 

                          Race                      White

 

                          Ethnic Group              Not  or 









Author







                          Organization              [facility]

 

                          Address                   [address] [place] [address]









Support







                Name            Relationship    Address         Phone

 

                Adriana Raza  Unavailable     [address]      [phone]



                                                [place] [address] 









Care Team Providers







                    Name                Role                Phone

 

                    Okmarissa  FRED          Primary Care Provider [phone]









Encounter Details







             Date         Type         Department   Care Team    Description

 

                    11/15/2020          Orders Only         [facility]



                          Doctor Unassigned, No     



                                                            [address] [facility]



                                        Name



                                        



                                                [place] [id number] [place] [address] 







Allergies

No Known Allergiesdocumented as of this encounter (statuses as of 2020)



Medications







          Medication Sig       Dispensed Refills   Start Date End Date  Status

 

          traMADOL (ULTRAM) 50 TAKE 1 TABLET BY           2         2016  

         Active



          mg tablet MOUTH AT BEDTIME                                         



                    AS NEEDED                                         

 

          omeprazole (PRILOSEC) Take 20 mg by           3         2016    

       Active



          20 mg capsule mouth as needed.                                        

 

 

          Blood-Glucose Meter Use as directed. 1 Kit     0         2018   

        Active



          (ONETOUCH ULTRA2) Kit DX:E11.9, TID                                   

      

 

          lancets (ONE TOUCH Use as directed, 100 Each  1         2019    

       Active



          DELICA) 33 gauge Misc once daily,                                     

    



                    DX:E11.9                                          

 

          pioglitazone 15 mg Take 1 tablet by 90 tablet 3         2020    

       Active



          tabletIndications: mouth daily.                                       

  



          Type 2 diabetes                                                   



          mellitus with                                                   



          vascular disease                                                   

 

          blood sugar Use as directed, 300 Strip 1         2020           

Active



          diagnostic (ONETOUCH TID, DX:E11.9                                    

     



          ULTRA TEST)                                                   



          stripIndications:                                                   



          Type 2 diabetes                                                   



          mellitus with                                                   



          vascular disease                                                   

 

          clopidogreL 75 mg Take 1 tablet by 30 tablet 0         2020     

      Active



          tabletIndications: mouth daily.                                       

  



          TIA (transient                                                   



          ischemic attack)                                                   

 

          SITagliptin-metformin Take 1 tablet by 180 tablet 3         2020

           Active



          (JANUMET XR)  mouth 2 (two)                                     

    



          mg [id number]: times daily.                                      

   



          Type 2 diabetes                                                   



          mellitus with                                                   



          vascular disease                                                   

 

          amLODIPine 10 mg Take 10 mg by           0         2019         

  Active



          tablet    mouth daily.                                         

 

          mupirocin 2 % Apply to surgical 22 g      1         2020        

   Active



          ointmentIndications: wound with                                       

  



          Neoplasm of uncertain dressing changes                                

         



          behavior of skin of twice a day                                       

  



          back                                                        

 

          glipiZIDE 5 mg Take 1 tablet by 270 tablet 1         2020       

    Active



          tabletIndications: mouth with                                         



          Type 2 diabetes breakfast and 2                                       

  



          mellitus with tablets with                                         



          vascular disease dinner.                                           

 

          losartan 50 mg Take 1 tablet by 90 tablet 1         10/07/2020        

   Active



          tabletIndications: mouth daily.                                       

  



          Essential                                                   



          hypertension                                                   



documented as of this encounter (statuses as of 2020)



Active Problems







                          Problem                   Noted Date

 

                          Dyslipidemia              2016

 

                          Essential hypertension    2016

 

                          HLD (hyperlipidemia)      2016

 

                          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 

016



documented as of this encounter (statuses as of 2020)



Social History







             Tobacco Use  Types        Packs/Day    Years Used   Date

 

             Current Some Day Smoker Cigars                                 









                Smokeless Tobacco: Never Used                                 









                                        Comments: Occasional Cigar Smoker









                Alcohol Use     Drinks/Week     oz/Week         Comments

 

                Yes             0 Standard drinks or equivalent 0.0             

Social Drinker









                          Sex Assigned at Birth     Date Recorded

 

                          Not on file               



documented as of this encounter



Last Filed Vital Signs

Not on filedocumented in this encounter



Plan of Treatment







             Date         Type         Specialty    Care Team    Description

 

                2021      Office Visit    Endocrinology Diabetes & Abdi Randhawa MD



                                        



                                                Metabolism      [address], [place] 

[id number] [phone] (Fax) 

 

                03/15/2021      Office Visit    Dermatology     Deepa Daily MD



                                        



                                                                [address] 

[place] [place] [address]

[phone] (Fax) 









                Health Maintenance Due Date        Last Done       Comments

 

                HEPATITIS C (HCV) SCREEN 1950                      

 

                EYE EXAM        1960                      

 

                DTaP,Tdap,and Td Vaccines 1969                      



                (1 - Tdap)                                      

 

                COLON CANCER SCREENING 2000                      



                ANNUAL FIT/FOBT                                 

 

                COLON CANCER SCREENING FIT 2000                      



                DNA EVERY 3 YEARS                                 

 

                COLON CANCER SCREENING 2000                      



                SIGMOIDOSCOPY EVERY 5 YEARS                                 

 

                COLONOSCOPY     2000                      

 

                Colorectal Cancer Screening 2000                      

 

                Zoster Recombinant Vaccine 2000                      



                (SHINGRIX) (1 of 2)                                 

 

                LUNG CANCER SCREEN: 2005                      



                Recommended for age 55-80                                 



                with 30 + pack year history                                 

 

                Medicare Wellness Visit 2015                      

 

                PNEUMOCOCCAL VACCINES 65+ 2015                      



                (1 of 1 - PPSV23)                                 

 

                CREATININE (SERUM) 2021, 2017, 



                                                2016      

 

                FOOT EXAM       2021, 2020, 



                                                2019, Additional 



                                                history exists  

 

                LDL-C           2021, 2017, 



                                                2016      

 

                Depression Screening 2021      

 

                HgA1C           2021      10/07/2020, 2020, 



                                                2020, Additional 



                                                history exists  

 

                URINE MICROALBUMIN 2021, 2016 Postpo

tamra from



                                                                2018 (Refu

sed)

 

                INFLUENZA VACCINE (#1) 2021                      Postponed

 from



                                                                2020 (Pare

nt



                                                                Refused)



documented as of this encounter



Procedures







             Procedure Name Priority     Date/Time    Associated Diagnosis Comme

nts

 

             AUTHORIZATION FOR RELEASE Routine      11/15/2020 12:01 AM         

     



             OF PHI                    CST                       



documented in this encounter



Results

Not on filedocumented in this encounter



Insurance







          Payer     Benefit Plan / Subscriber ID Effective Dates Phone     Addre

ss   Type



                    Group                                             

 

          [employer] [id number]  2020-Prese               

      Medicare Adv



          PLUS      PLUS CHOICE           nt                            HMO/POS



documented as of this encounter

## 2021-01-02 NOTE — XMS REPORT
Continuity of Care Document

                           Created on:2021



Patient:ANETTE IBRAHIM

Sex:Male

:1950

External Reference #:[id number]





Demographics







                          Address                   [address]



                                                    [place] [address]

 

                          Home Phone                [phone]

 

                          Work Phone                [phone]

 

                          Mobile Phone              [phone]

 

                          Email Address             NONE

 

                          Preferred Language        English

 

                          Marital Status            Unknown

 

                          Scientologist Affiliation     Unknown

 

                          Race                      Unknown

 

                          Additional Race(s)        Unavailable



                                                    White

 

                          Ethnic Group              Unknown









Author







                          Organization              [facility]

t

 

                          Address                   [address]



                                                    [place] [address]

 

                          Phone                     [phone]









Support







                Name            Relationship    Address         Phone

 

                Ry          Spouse          [phone]     [phone]



                                                [place] [address] 

 

                yR          Spouse          [address] [facility] [address] [phone]



                                                [place] [address] 









Care Team Providers







                    Name                Role                Phone

 

                    Everardo Thrasher MD Primary Care Physician [phone]

 

                    Doctor Unassigned,  Name Attending Clinician Unavailable

 

                    Edis MEDLEY              Attending Clinician [phone]

 

                    KITTY ACEVEDO Attending Clinician Unavail

able

 

                    KITTY ACEVEDO Admitting Clinician Unavail

able









Problems







       Condition Condition Condition Status Onset  Resolution Last   Treating Co

mments 

Source



       Name   Details Category        Date   Date   Treatment Clinician        



                                                 Date                 

 

       Back pain Back pain Disease Active                              [facility]



                                   [facility]



                                   [facility]                                 [facility]

 

       Stress Stress Disease Active                              [facility]



                                                                  [facility]



                                   [facility]                             [facility]



                                   [facility]                                 [facility]

 

       [facility]    Disease Active                              [facility]



       (intracere (intracere                                              [place]

[facility]



       bral   bral                 00:00:                             Medical



       hemorrhage hemorrhage               00                                 Ce

nter



       )      )                                                       

 

       Thalamic Thalamic Disease Active                              [facility]

t



       hemorrhage hemorrhage                                              [place]

[facility]



                                   00:00:                             Medical



                                   00                                 Center

 

       Uncontroll Uncontroll Disease Active                              [facility]

[facility]



       ed     ed                                                  [facility]



       hypertensi hypertensi               00:00:                             Me

dical



       on     on                   00                                 Center

 

       Type 2 Type 2 Disease Active                              [facility]



       diabetes diabetes                                              [facility] 

-



       mellitus mellitus               00:00:                             Medica

l



                                   00                                 Center

 

       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

[facility]



       tumor of                                    02:46:41               l



       tendon   Giant                                                  [place]



       sheath cell tumor                                                  



              of tendon                                                  



              sheath                                                  



                                                                      



                                                                      



              Active                                                  



                                                                      



                                                                      



                                                                      



                                                                      



              Problem                                                  



                                                                      



                                                                      



              10/05/2016                                                  



                                                                      



                                                                      



                                                                      



                                                                      



                                                                      



              Edna Pulliam                                                  



                                                                      



                                                                      

 

       Ganglion        Problem Active               2016-10-05               [facility]

oria



       cyst of                                    02:46:41               l



       finger of   Ganglion                                                  Her

ibarra



       left hand cyst of                                                  



              finger of                                                  



              left hand                                                  



                                                                      



                                                                      



              Active                                                  



                                                                      



                                                                      



                                                                      



                                                                      



              Problem                                                  



                                                                      



                                                                      



              10/05/2016                                                  



                                                                      



                                                                      



                                                                      



                                                                      



                                                                      



              Edna Pulliam                                                  



                                                                      



                                                                      







Allergies, Adverse Reactions, Alerts







       Allergy Allergy Status Severity Reaction(s) Onset  Inactive Treating Comm

ents 

Source



       Name   Type                        Date   Date   Clinician        

 

       N.KRODDYA. N.KRODDYA. Active        Info Not 2016                      [facility]

ana



                                   Available 0-03                        l



                                          00:00:                      Bakari



                                          00                          







Family History







           Family Member Diagnosis  Comments   Start Date Stop Date  Source

 

           Unknown Family Family History            2016-10-05 2016-10-05 [facility]

al [facility]



           Member                           02:46:41   02:46:41   







Social History







           Social Habit Start Date Stop Date  Quantity   Comments   Source

 

           Sex Assigned At Birth                                             [facility]

 

           Tobacco use and exposure 2017 Never used            

[facility] -



                      00:00:00   00:00:00                         [facility]

 

           Alcohol intake 2017 Current               [facility]

es -



                      00:00:00   00:00:00   non-drinker of            Medical 

nter



                                            alcohol               



                                            (finding)             

 

           PneumoccalVaccine 2016-10-03 2016-10-03                       [facility]

l



                      00:00:00   00:00:00                         [place]









                Smoking Status  Start Date      Stop Date       Source

 

                Former smoker   2017 00:00:00 2017 00:00:00 [facility]







Medications







       Ordered Filled Start  Stop   Current Ordering Indication Dosage Frequency

 Signature

                    Comments            Components          Source



     Medication Medication Date Date Medication? Clinician                (SIG) 

          



     Name Name                                                   

 

     SITagliptin            Yes            1{tbl}      Take 1           [facility]

[facility]



     -metFORMIN      9-14                               tablet by           [facility]

s -



     (JANUMET)      12:25:                               mouth 2           Medic

al



      mg      33                                 (two)           Center



     per tablet                                         times           



                                                  daily with           



                                                  breakfast           



                                                  and            



                                                  dinner.           

 

     losartan            Yes            100mg QD   Take 100           [facility]



     (COZAAR)      9-14                               mg by           [facility]



     100 MG      12:25:                               mouth           Medical



     tablet      33                                 daily.           Center

 

     albuterol            Yes            4{puff}      Inhale 4           C

[facility]



     HFA       9-14                               puffs by           [facility]



     (VENTOLIN      12:25:                               mouth via           Med

ical



     HFA) 90      33                                 inhaler           Center



     mcg/actuati                                         every 6           



     on inhaler                                         (six)           



                                                  hours as           



                                                  needed for           



                                                  Wheezing.           

 

     glimepiride            Yes            4mg       Take 4 mg           C

[facility]



     (AMARYL) 4      9-14                               by mouth 2           [facility]

es -



     MG tablet      12:25:                               (two)           Medical



               33                                 times           Center



                                                  daily           



                                                  before           



                                                  meals.           

 

     simvastatin      2016      Yes  EDNA                1 tab(s)        

   [facility]



               0-05           HERACLIO                               l



               02:46:                                              [place]



               41                                                

 

     Janumet      2016      Yes  EDNA                1 tab(s)           M

emoria



               0-05           HERACLIO                               l



               02:46:                                              [facility]



               41                                                

 

     losartan      2016      Yes  EDNA                1 tab(s)           

[facility]



               0-05           HERACLIO                               l



               02:46:                                              [facility]



               41                                                

 

     glimepiride      2016      Yes  EDNA                1 tab(s)        

   [facility]



               0-05           HERACLIO                               l



               02:46:                                              [place]



               41                                                

 

     Invokana      2016      Yes  EDNA                1 tab(s)           

[facility]



               0-05           HERACLIO                               l



               02:46:                                              [place]



               41                                                

 

     Plavix      2016      Yes  EDNA                1 tab(s)           Me

moria



               0-05           HERACLIO                               l



               02:46:                                              [place]



               41                                                







Vital Signs







             Vital Name   Observation Time Observation Value Comments     Source

 

             Weight       2016-10-03 20:00:00                           [facility]

 [facility]

 

             Diastolic (mm Hg) 2016-10-03 20:00:00                           [facility]

orie[facility]le Villegas

 

             Systolic (mm Hg) 2016-10-03 20:00:00                           [facility] Villegas







Procedures

This patient has no known procedures.



Encounters







        Start   End     Encounter Admission Attending Care    Care    Encounter 

Source



        Date/Time Date/Time Type    Type    Clinicians Facility Department ID   

   

 

        2020-11-15 2020-11-15 Orders          Doctor ENEGL    [id number]

20 



        00:00:00 00:00:00 Only            Unassigned, LILIANA   [id number]       

  



                                        [facility] [id number]         



                                                        [phone]         



                                                        009             

 

        2020 Orders          Doctor ENGEL    [id number]

71 



        00:00:00 00:00:00 Only            Unassigned, LILIANA   [id number]       

  



                                        [facility] [id number]         



                                                        [phone]         



                                                        009             

 

        2020-10-07 2020-10-07 Office          Randhawa    [facility]    [id number]

38 



        11:04:29 11:41:15 Visit           Ileana Rouse [id number]         



                                                Junaid [id number]         



                                                Michel [phone]         



                                                [facility]     220             



                                                [facility]                 

 

        2020-10-07 2020-10-07 Orders          Doctor  TORO    [id number]

36 



        00:00:00 00:00:00 Only            Unassigned, LILIANA   [id number]       

  



                                        [facility] [id number]         



                                                        [phone]         



                                                        009             

 

        2016-10-03 2016-10-03 Outpatient                 Edna Edna  8

2428   [facility]



        15:00:00 15:00:00                         B       Heraclio Pulliam MD, MD                      







Results







           Test Description Test Time  Test Comments Results    Result Comments 

Source









                    POCT-GLUCOSE METER  2017 07:54:00 









                      Test Item  Value      Reference Range Interpretation Comme

[facility]









             POC-GLUCOSE METER (BEAKER) (test 194 mg/dL           H       

     TESTED AT [facility] [address]



             code = 1538)                                        [facility] [address]

0



CBC W/PLT COUNT &amp; AUTO [id number] 05:12:00





             Test Item    Value        Reference Range Interpretation Comments

 

             WHITE BLOOD CELL COUNT (BEAKER) 6.0 K/ L     3.5-10.5              

    



             (test code = 775)                                        

 

             RED BLOOD CELL COUNT (BEAKER) 4.79 M/ L    4.63-6.08               

  



             (test code = 761)                                        

 

             HEMOGLOBIN (BEAKER) (test code = 14.4 GM/DL   13.7-17.5            

     



             410)                                                

 

             HEMATOCRIT (BEAKER) (test code = 43.0 %       40.1-51.0            

     



             411)                                                

 

             MEAN CORPUSCULAR VOLUME (BEAKER) 89.8 fL      79.0-92.2            

     



             (test code = 753)                                        

 

             MEAN CORPUSCULAR HEMOGLOBIN 30.1 pg      25.7-32.2                 



             (BEAKER) (test code = 751)                                        

 

             MEAN CORPUSCULAR HEMOGLOBIN CONC 33.5 GM/DL   32.3-36.5            

     



             (BEAKER) (test code = 752)                                        

 

             RED CELL DISTRIBUTION WIDTH 12.6 %       11.6-14.4                 



             (BEAKER) (test code = 412)                                        

 

             PLATELET COUNT (BEAKER) (test 160 K/CU MM  150-450                 

  



             code = 756)                                         

 

             MEAN PLATELET VOLUME (BEAKER) 11.4 fL      9.4-12.4                

  



             (test code = 754)                                        

 

             NUCLEATED RED BLOOD CELLS 0 /100 WBC   0-0                       



             (BEAKER) (test code = 413)                                        

 

             NEUTROPHILS RELATIVE PERCENT 65 %                                  

 



             (BEAKER) (test code = 429)                                        

 

             LYMPHOCYTES RELATIVE PERCENT 23 %                                  

 



             (BEAKER) (test code = 430)                                        

 

             MONOCYTES RELATIVE PERCENT 9 %                                    



             (BEAKER) (test code = 431)                                        

 

             EOSINOPHILS RELATIVE PERCENT 3 %                                   

 



             (BEAKER) (test code = 432)                                        

 

             BASOPHILS RELATIVE PERCENT 1 %                                    



             (BEAKER) (test code = 437)                                        

 

             NEUTROPHILS ABSOLUTE COUNT 3.86 K/ L    1.78-5.38                 



             (BEAKER) (test code = 670)                                        

 

             LYMPHOCYTES ABSOLUTE COUNT 1.34 K/ L    1.32-3.57                 



             (BEAKER) (test code = 414)                                        

 

             MONOCYTES ABSOLUTE COUNT (BEAKER) 0.55 K/ L    0.30-0.82           

      



             (test code = 415)                                        

 

             EOSINOPHILS ABSOLUTE COUNT 0.15 K/ L    0.04-0.54                 



             (BEAKER) (test code = 416)                                        

 

             BASOPHILS ABSOLUTE COUNT (BEAKER) 0.03 K/ L    0.01-0.08           

      



             (test code = 417)                                        

 

             IMMATURE GRANULOCYTES-RELATIVE 0 %          0-1                    

   



             PERCENT (BEAKER) (test code =                                      

  



             2801)                                               



[id number] 05:02:00





             Test Item    Value        Reference Range Interpretation Comments

 

             PHOSPHORUS (BEAKER) (test code = 2.4 mg/dL    2.3-4.7              

     



             604)                                                



[id number] 05:02:00





             Test Item    Value        Reference Range Interpretation Comments

 

             MAGNESIUM (BEAKER) (test code = 2.0 mg/dL    1.6-2.6               

    



             627)                                                



BASIC METABOLIC [id number] 05:02:00





             Test Item    Value        Reference Range Interpretation Comments

 

             SODIUM (BEAKER) 139 meq/L    136-145                   



             (test code = 381)                                        

 

             POTASSIUM (BEAKER) 3.4 meq/L    3.5-5.1      L            



             (test code = 379)                                        

 

             CHLORIDE (BEAKER) 107 meq/L                        



             (test code = 382)                                        

 

             CO2 (BEAKER) (test 23 meq/L     22-29                     



             code = 355)                                         

 

             BLOOD UREA NITROGEN 9 mg/dL      7-21                      



             (BEAKER) (test code                                        



             = 354)                                              

 

             CREATININE (BEAKER) 0.72 mg/dL   0.57-1.25                 



             (test code = 358)                                        

 

             GLUCOSE RANDOM 166 mg/dL           H            



             ([facility]) (test code                                        



             = 652)                                              

 

             CALCIUM ([facility]) 9.1 mg/dL    8.4-10.2                  



             (test code = 697)                                        

 

             EGFR ([facility]) (test 109 mL/min/1.73                           ESTIM

ATED GFR IS



             code = 1092) sq m                                   NOT AS ACCURATE

 AS



                                                                 CREATININE



                                                                 CLEARANCE IN



                                                                 PREDICTING



                                                                 GLOMERULAR



                                                                 FILTRATION RATE

.



                                                                 ESTIMATED GFR I

S



                                                                 NOT APPLICABLE 

FOR



                                                                 DIALYSIS PATIEN

TS.



POCT-GLUCOSE [id number] 00:42:00





             Test Item    Value        Reference Range Interpretation Comments

 

             POC-GLUCOSE METER 147 mg/dL           H            TESTED AT 

[place] [address]



             ([facility]) (test code =                                        [facility]



             1538)                                               [address]



POCT-GLUCOSE [id number] 00:42:00





             Test Item    Value        Reference Range Interpretation Comments

 

             POC-GLUCOSE METER 173 mg/dL           H            TESTED AT 

[place] [address]



             ([facility]) (test code =                                        [facility]



             1538)                                               [address]



POCT-GLUCOSE [id number] 17:04:00





             Test Item    Value        Reference Range Interpretation Comments

 

             POC-GLUCOSE METER 200 mg/dL           H            TESTED AT 

[place] [address]



             ([facility]) (test code =                                        [facility]



             1538)                                               [address]



HEMOGLOBIN [id number] 16:58:00





             Test Item    Value        Reference Range Interpretation Comments

 

             HEMOGLOBIN A1C ([facility]) (test code = 7.3 %        4.3-6.1      H   

         



             368)                                                



POCT-GLUCOSE [id number] 12:15:00





             Test Item    Value        Reference Range Interpretation Comments

 

             POC-GLUCOSE METER 189 mg/dL           H            TESTED AT 

[place] [address]



             ([facility]) (test code =                                        [facility]



             1538)                                               [address]



CBC W/PLT COUNT &amp; AUTO [id number] 08:58:00





             Test Item    Value        Reference Range Interpretation Comments

 

             WHITE BLOOD CELL COUNT ([facility]) 5.5 K/ L     3.5-10.5              

    



             (test code = 775)                                        

 

             RED BLOOD CELL COUNT ([facility]) 4.74 M/ L    4.63-6.08               

  



             (test code = 761)                                        

 

             HEMOGLOBIN ([facility]) (test code = 14.8 GM/DL   13.7-17.5            

     



             410)                                                

 

             HEMATOCRIT ([facility]) (test code = 42.4 %       40.1-51.0            

     



             411)                                                

 

             MEAN CORPUSCULAR VOLUME (BEAKER) 89.5 fL      79.0-92.2            

     



             (test code = 753)                                        

 

             MEAN CORPUSCULAR HEMOGLOBIN 31.2 pg      25.7-32.2                 



             (BEAKER) (test code = 751)                                        

 

             MEAN CORPUSCULAR HEMOGLOBIN CONC 34.9 GM/DL   32.3-36.5            

     



             (BEAKER) (test code = 752)                                        

 

             RED CELL DISTRIBUTION WIDTH 12.9 %       11.6-14.4                 



             (BEAKER) (test code = 412)                                        

 

             PLATELET COUNT (BEAKER) (test 200 K/CU MM  150-450                 

  



             code = 756)                                         

 

             MEAN PLATELET VOLUME (BEAKER) 11.0 fL      9.4-12.4                

  



             (test code = 754)                                        

 

             NUCLEATED RED BLOOD CELLS 0 /100 WBC   0-0                       



             (BEAKER) (test code = 413)                                        

 

             NEUTROPHILS RELATIVE PERCENT 72 %                                  

 



             (BEAKER) (test code = 429)                                        

 

             LYMPHOCYTES RELATIVE PERCENT 16 %                                  

 



             (BEAKER) (test code = 430)                                        

 

             MONOCYTES RELATIVE PERCENT 9 %                                    



             (BEAKER) (test code = 431)                                        

 

             EOSINOPHILS RELATIVE PERCENT 3 %                                   

 



             (BEAKER) (test code = 432)                                        

 

             BASOPHILS RELATIVE PERCENT 1 %                                    



             (BEAKER) (test code = 437)                                        

 

             NEUTROPHILS ABSOLUTE COUNT 3.95 K/ L    1.78-5.38                 



             (BEAKER) (test code = 670)                                        

 

             LYMPHOCYTES ABSOLUTE COUNT 0.89 K/ L    1.32-3.57    L            



             (BEAKER) (test code = 414)                                        

 

             MONOCYTES ABSOLUTE COUNT (BEAKER) 0.49 K/ L    0.30-0.82           

      



             (test code = 415)                                        

 

             EOSINOPHILS ABSOLUTE COUNT 0.14 K/ L    0.04-0.54                 



             (BEAKER) (test code = 416)                                        

 

             BASOPHILS ABSOLUTE COUNT (BEAKER) 0.03 K/ L    0.01-0.08           

      



             (test code = 417)                                        

 

             IMMATURE GRANULOCYTES-RELATIVE 1 %          0-1                    

   



             PERCENT (BEAKER) (test code =                                      

  



             2801)                                               



CT BRAIN WITHOUT IV CONTRAST - [id number] 07:14:00Reason for 
exam:-&gt;L-thalamic ICHFINAL REPORT PATIENT ID:   [id number] CT head without 
contrast INDICATION: Left thalamic intracranial hemorrhage. TECHNIQUE: 
Contiguous axial images through the head without contrast on the portable CT un
it. This exam was performed according to our departmental dose optimization 
program which includes automated exposure control, adjustment of the mA and/or 
kV according to patient size and/or use of iterative reconstruction technique. 
COMPARISON: None available FINDINGS:There is anteromedial left thalamic 
hyperdensity measuring up to 1.4 cm in keeping with hemorrhage. A thin rim of 
surrounding edema is suspected, but no remarkable mass effect is evident. There 
is nonspecific white matter hypoattenuation in keeping with microvascular 
ischemia with age indeterminate changes. Please note that CT is insensitive for 
early or small infarcts. Generalized volume loss and vascular calcifications are
noted. There is no hydrocephalus or midline shift. There is minimal chronic 
sinus mucosal thickening. The mastoid air cells and orbits are unremarkable. The
calvarium is intact. IMPRESSION: Left thalamic smallvolume hyperdense focus in 
keeping with hemorrhage, without remarkable mass effect. Comparison with recent 
imaging or short term CT follow up is advised to assess stability. Findings 
discussed with  (neuro ICU) at 7:10 AM Signed: Kathryn Preston 
MDReport Verified Date/Time:  201707:14:49 Reading Location: [address] 
Neuro Reading Room   Electronically signed by: KATHRYN PRESTON M.D. on 
2017 07:14 AMPOCT-GLUCOSE [id number] 06:25:00





             Test Item    Value        Reference Range Interpretation Comments

 

             POC-GLUCOSE METER 194 mg/dL           H            TESTED AT 

[facility] [address]



             (BEAKER) (test code =                                        [place]



             1538)                                               [address]



[id number] 04:35:00





             Test Item    Value        Reference Range Interpretation Comments

 

             PHOSPHORUS (BEAKER) (test code = 2.4 mg/dL    2.3-4.7              

     



             604)                                                



Once on admission and Daily AM afterwardsOnce on admission and Daily AM 
afterwardsOnce on admission and Daily AM [id number] 04:35:00





             Test Item    Value        Reference Range Interpretation Comments

 

             MAGNESIUM (BEAKER) (test code = 1.9 mg/dL    1.6-2.6               

    



             627)                                                



Once on admission and Daily AM afterwardsOnce on admission and Daily AM 
afterwardsOnce on admission and Daily AM afterwardsBASIC METABOLIC PANEL
2017 04:35:00





             Test Item    Value        Reference Range Interpretation Comments

 

             SODIUM (BEAKER) 139 meq/L    136-145                   



             (test code = 381)                                        

 

             POTASSIUM (BEAKER) 3.6 meq/L    3.5-5.1                   



             (test code = 379)                                        

 

             CHLORIDE (BEAKER) 106 meq/L                        



             (test code = 382)                                        

 

             CO2 (BEAKER) (test 27 meq/L     22-29                     



             code = 355)                                         

 

             BLOOD UREA NITROGEN 10 mg/dL     7-21                      



             (BEAKER) (test code                                        



             = 354)                                              

 

             CREATININE (BEAKER) 0.76 mg/dL   0.57-1.25                 



             (test code = 358)                                        

 

             GLUCOSE RANDOM 184 mg/dL           H            



             (BEAKER) (test code                                        



             = 652)                                              

 

             CALCIUM (BEAKER) 8.9 mg/dL    8.4-10.2                  



             (test code = 697)                                        

 

             EGFR (BEAKER) (test 102 mL/min/1.73                           ESTIM

ATED GFR IS



             code = 1092) sq m                                   NOT AS ACCURATE

 AS



                                                                 CREATININE



                                                                 CLEARANCE IN



                                                                 PREDICTING



                                                                 GLOMERULAR



                                                                 FILTRATION RATE

.



                                                                 ESTIMATED GFR I

S



                                                                 NOT APPLICABLE 

FOR



                                                                 DIALYSIS PATIEN

TS.



Once on admission and Daily AM afterwardsOnce on admission and Daily AM 
afterwardsOnce on admission and Daily AM afterwardsPOCT-GLUCOSE [id number] 
00:30:00





             Test Item    Value        Reference Range Interpretation Comments

 

             POC-GLUCOSE METER 204 mg/dL           H            TESTED AT 

[facility] [address]



             ([facility]) (test code =                                        [place]



             [address])                                               [address]



PROTHROMBIN [id number] 19:35:00





             Test Item    Value        Reference Range Interpretation Comments

 

             PROTIME (BEAKER) (test code = 16.6 seconds 11.7-14.7    H          

  



             759)                                                

 

             INR (BEAKER) (test code = 370) 1.4          <=5.9                  

   



RECOMMENDED COUMADIN/WARFARIN INR THERAPY RANGESSTANDARD DOSE: 2.0 - 3.0   
Includes: PROPHYLAXIS forvenous thrombosis, systemic embolization; TREATMENT for
venous thrombosis and/or pulmonary embolus.HIGH RISK: Target INR is 2.5-3.5 for 
patients with mechanical heart valves.[id number] 19:35:00





             Test Item    Value        Reference Range Interpretation Comments

 

             PARTIAL THROMBOPLASTIN TIME 29.2 seconds 22.5-36.0                 



             (BEAKER) (test code = 760)                                        



HEPATIC FUNCTION [id number] 19:17:00





             Test Item    Value        Reference Range Interpretation Comments

 

             TOTAL PROTEIN (BEAKER) (test code = 7.5 gm/dL    6.0-8.3           

        



             770)                                                

 

             ALBUMIN (BEAKER) (test code = 1145) 4.2 g/dL     3.5-5.0           

        

 

             BILIRUBIN TOTAL (BEAKER) (test code 0.7 mg/dL    0.2-1.2           

        



             = 377)                                              

 

             BILIRUBIN DIRECT ([employer]) (test 0.3 mg/dL    0.1-0.5               

    



             code = 706)                                         

 

             ALKALINE PHOSPHATASE ([employer]) (test 96 U/L                   

        



             code = 346)                                         

 

             AST (SGOT) (BEAKER) (test code = 21 U/L       5-34                 

     



             353)                                                

 

             ALT (SGPT) (BEAKER) (test code = 25 U/L       6-55                 

     



             347)

## 2021-01-03 NOTE — EKG
Test Date:    2021-01-02               Test Time:    10:44:02

Technician:   GLENN                                    

                                                     

MEASUREMENT RESULTS:                                       

Intervals:                                           

Rate:         84                                     

TX:           202                                    

QRSD:         98                                     

QT:           404                                    

QTc:          477                                    

Axis:                                                

P:            55                                     

TX:           202                                    

QRS:          95                                     

T:            63                                     

                                                     

INTERPRETIVE STATEMENTS:                                       

                                                     

Normal sinus rhythm

Rightward axis

Borderline ECG

Compared to ECG 06/01/2018 16:32:06

Right-axis deviation now present



Electronically Signed On 01-03-21 15:26:43 CST by Danny Gerardo

## 2022-07-31 ENCOUNTER — HOSPITAL ENCOUNTER (EMERGENCY)
Dept: HOSPITAL 97 - ER | Age: 72
Discharge: HOME | End: 2022-07-31
Payer: COMMERCIAL

## 2022-07-31 DIAGNOSIS — F17.210: ICD-10-CM

## 2022-07-31 DIAGNOSIS — E11.9: ICD-10-CM

## 2022-07-31 DIAGNOSIS — U07.1: Primary | ICD-10-CM

## 2022-07-31 DIAGNOSIS — I10: ICD-10-CM

## 2022-07-31 LAB
ALBUMIN SERPL BCP-MCNC: 3.6 G/DL (ref 3.4–5)
ALP SERPL-CCNC: 70 U/L (ref 45–117)
ALT SERPL W P-5'-P-CCNC: 39 U/L (ref 12–78)
AST SERPL W P-5'-P-CCNC: 20 U/L (ref 15–37)
BUN BLD-MCNC: 16 MG/DL (ref 7–18)
GLUCOSE SERPLBLD-MCNC: 184 MG/DL (ref 74–106)
HCT VFR BLD CALC: 40.8 % (ref 39.6–49)
INR BLD: 1.33
LYMPHOCYTES # SPEC AUTO: 0.3 K/UL (ref 0.7–4.9)
MCV RBC: 89.8 FL (ref 80–100)
PMV BLD: 8.6 FL (ref 7.6–11.3)
POTASSIUM SERPL-SCNC: 3.4 MMOL/L (ref 3.5–5.1)
RBC # BLD: 4.54 M/UL (ref 4.33–5.43)

## 2022-07-31 PROCEDURE — 83605 ASSAY OF LACTIC ACID: CPT

## 2022-07-31 PROCEDURE — 85610 PROTHROMBIN TIME: CPT

## 2022-07-31 PROCEDURE — 82947 ASSAY GLUCOSE BLOOD QUANT: CPT

## 2022-07-31 PROCEDURE — 36415 COLL VENOUS BLD VENIPUNCTURE: CPT

## 2022-07-31 PROCEDURE — 71045 X-RAY EXAM CHEST 1 VIEW: CPT

## 2022-07-31 PROCEDURE — 85730 THROMBOPLASTIN TIME PARTIAL: CPT

## 2022-07-31 PROCEDURE — 93005 ELECTROCARDIOGRAM TRACING: CPT

## 2022-07-31 PROCEDURE — 87040 BLOOD CULTURE FOR BACTERIA: CPT

## 2022-07-31 PROCEDURE — 80053 COMPREHEN METABOLIC PANEL: CPT

## 2022-07-31 PROCEDURE — 99284 EMERGENCY DEPT VISIT MOD MDM: CPT

## 2022-07-31 PROCEDURE — 85025 COMPLETE CBC W/AUTO DIFF WBC: CPT

## 2022-07-31 NOTE — EDPHYS
Physician Documentation                                                                           

 [facility]                                                                 

Name: Ramírez Raza                                                                              

Age: 71 yrs                                                                                       

Sex: Male                                                                                         

: 1950                                                                                   

MRN: [id number]                                                                                   

Arrival Date: 2022                                                                          

Time: 20:14                                                                                       

Account#: [id number]                                                                            

Bed 15                                                                                            

Private MD:                                                                                       

ED Physician Devyn Alamo                                                                       

HPI:                                                                                              

                                                                                             

21:34 This 71 yrs old Male presents to ER via EMS with complaints of cough, fever.            kb  

21:34 The patient or guardian reports cough, that is intermittent, described as mild, flu     kb  

      symptoms, low-grade fever. Onset: The symptoms/episode began/occurred last night.           

      Severity of symptoms: At their worst the symptoms were moderate, in the emergency           

      department the symptoms are unchanged. Modifying factors: The symptoms are alleviated       

      by nothing, the symptoms are aggravated by nothing. Associated signs and symptoms:          

      Pertinent positives: fever, Pertinent negatives: chest pain, diarrhea, ear ache,            

      nausea, rhinorrhea, sore throat, vomiting. The patient has not experienced similar          

      symptoms in the past. The patient has not recently seen a physician. Patient reports        

      cough and fever that started last night. States his wife has COVID currently..              

                                                                                                  

Historical:                                                                                       

- Allergies:                                                                                      

20:15 No Known Allergies;                                                                     ke1 

- PMHx:                                                                                           

20:15 Diabetes - NIDDM; hemorrhoids; High Cholesterol; Hypertension;                          ke1 

                                                                                                  

- Immunization history:: Adult Immunizations.                                                     

- Social history:: Smoking status: Patient reports the use of cigarette tobacco                   

  products, denies chronic smoking, but will smoke occasionally, cigars, Patient uses.            

                                                                                                  

                                                                                                  

ROS:                                                                                              

21:34 Cardiovascular: Negative for chest pain, palpitations, and edema.                       kb  

21:34 Constitutional: Positive for fever.                                                         

21:34 Respiratory: Positive for cough.                                                            

21:34 All other systems are negative.                                                             

                                                                                                  

Exam:                                                                                             

21:34 Constitutional:  This is a well developed, well nourished patient who is awake, alert,  kb  

      and in no acute distress. Head/Face:  Normocephalic, atraumatic. ENT:  Moist Mucous         

      membranes Cardiovascular:  Regular rate and rhythm with a normal S1 and S2.  No             

      gallops, murmurs, or rubs.  No pulse deficits. Respiratory:  Respirations even and          

      unlabored. No increased work of breathing. Talking in full sentences Abdomen/GI:  Soft,     

      non-tender. No distention Skin:  Warm, dry with normal turgor.  Normal color. MS/           

      Extremity:  Pulses equal, no cyanosis.  Neurovascular intact.  Full, normal range of        

      motion. Neuro:  Awake and alert, GCS 15, oriented to person, place, time, and               

      situation. Moves all extremities. Normal gait. Psych:  Awake, alert, with orientation       

      to person, place and time.  Behavior, mood, and affect are within normal limits.            

21:38 ECG was reviewed by the Attending Physician.                                            kb  

                                                                                                  

Vital Signs:                                                                                      

20:19  / 65; Pulse 81; Resp 25; Temp 101(O); Pulse Ox 97% on R/A; Weight 90.72 kg;      ke1 

      Height 5 ft. 10 in. (177.80 cm); Pain 0/10;                                                 

21:27  / 60; Pulse 82; Temp 99.9(O); Pulse Ox 96% on R/A;                               ke1 

22:27  / 77; Pulse 79; Resp 21; Temp 98.9(O); Pulse Ox 95% on R/A; Pain 0/10;           ke1 

20:19 Body Mass Index 28.70 (90.72 kg, 177.80 cm)                                             ke1 

                                                                                                  

MDM:                                                                                              

20:16 Patient medically screened.                                                             kb  

21:35 Data reviewed: vital signs, nurses notes. Data interpreted: Pulse oximetry: on room air kb  

      is 96 %. Interpretation: normal.                                                            

22:50 Counseling: I had a detailed discussion with the patient and/or guardian regarding: the kb  

      historical points, exam findings, and any diagnostic results supporting the                 

      discharge/admit diagnosis, lab results, radiology results, the need for outpatient          

      follow up, a family practitioner, to return to the emergency department if symptoms         

      worsen or persist or if there are any questions or concerns that arise at home. ED          

      course: Pt consents to bebtelovimab injection. .                                            

23:01 ED course: Bebtelovimab is currently unavailable. Pt educated and is ok with doing OTC  kb  

      medications.                                                                                

                                                                                                  

                                                                                             

20:17 Order name: Blood Culture Adult (2)                                                     kb  

                                                                                             

20:17 Order name: CBC with Diff; Complete Time: 21:10                                         kb  

                                                                                             

20:17 Order name: CMP; Complete Time: 21:27                                                   kb  

                                                                                             

20:17 Order name: Lactate; Complete Time: 21:33                                               kb  

                                                                                             

20:17 Order name: Protime (+inr); Complete Time: 21:18                                        kb  

                                                                                             

20:17 Order name: Ptt, Activated; Complete Time: 21:18                                        kb  

                                                                                             

20:17 Order name: Chest Single View XRAY; Complete Time: 21:03                                kb  

                                                                                             

20:17 Order name: Accucheck; Complete Time: 21:00                                             kb  

                                                                                             

20:17 Order name: Cardiac monitoring; Complete Time: 20:29                                    kb  

                                                                                             

20:17 Order name: EKG - Nurse/Tech; Complete Time: 20:38                                      kb  

                                                                                             

20:34 Order name: COVID-19 SARS RT PCR (Document "Date of Onset" if Symptomatic); Complete    kb  

      Time: 21:38                                                                                 

                                                                                             

21:14 Order name: Glucose, Ancillary Testing; Complete Time: 21:17                            EDMS

                                                                                             

20:17 Order name: IV Saline Lock - Large Bore; Complete Time: 21:00                           kb  

                                                                                             

20:17 Order name: Labs collected and sent; Complete Time: 21:00                               kb  

                                                                                             

20:17 Order name: O2 Per Protocol; Complete Time: 20:29                                       kb  

                                                                                             

20:17 Order name: O2 Sat Monitoring; Complete Time: 20:38                                     kb  

                                                                                             

21:38 Order name: Vital Signs; Complete Time: 21:41                                           kb  

                                                                                                  

EC: Rate is 81 beats/min. Rhythm is regular. QRS Axis is Normal. VA interval is normal at   kb  

      200 msec. QRS interval is normal at 86 msec. QT interval is normal at 432 msec.             

                                                                                                  

Administered Medications:                                                                         

21:04 Drug: Tylenol 1000 mg Route: PO;                                                        ke1 

22:45 Follow up: Response: Marked relief of symptoms                                          ke1 

23:04 Not Given (unavailablee): Bebtelovimab 175 mg IV at calculated rate once; as a single   ke1 

      dose                                                                                        

                                                                                                  

                                                                                                  

Disposition Summary:                                                                              

22 23:01                                                                                    

Discharge Ordered                                                                                 

      Location: Home                                                                          kb  

      Condition: Stable                                                                       kb  

      Diagnosis                                                                                   

        - SARS-associated coronavirus as the cause of diseases classified elsewhere           kb  

      Followup:                                                                               kb  

        - With: Emergency Department                                                               

        - When: As needed                                                                          

        - Reason: Worsening of condition                                                           

      Followup:                                                                               kb  

        - With: Private Physician                                                                  

        - When: 2 - 3 days                                                                         

        - Reason: Recheck today's complaints, Continuance of care, Re-evaluation by your           

      physician                                                                                   

      Discharge Instructions:                                                                     

        - Discharge Summary Sheet                                                             kb  

        - COVID-19                                                                            kb  

        - Viral Illness, Adult                                                                kb  

      Forms:                                                                                      

        - Medication Reconciliation Form                                                      kb  

        - Thank You Letter                                                                    kb  

        - Antibiotic Education                                                                kb  

        - Prescription Opioid Use                                                             kb  

Signatures:                                                                                       

Dispatcher [employer]                           Sade Jimenez, FNP-C                 FNP-Ellie Edmondson, RN                   RN   ke1                                                  

                                                                                                  

**************************************************************************************************

## 2022-07-31 NOTE — RAD REPORT
EXAM DESCRIPTION:  RAD - Chest Single View - 7/31/2022 8:44 pm

 

CLINICAL HISTORY:  FEVER

 

COMPARISON:  <Comparisons>

 

FINDINGS:  Lines: None.

Lungs: No evidence of edema or pneumonia.

Pleural: No significant pleural effusions or pneumothorax.

Cardiac: The heart size is within normal limits.

Bones: No acute fractures.

Other:

 

IMPRESSION:  No acute cardiopulmonary disease.

## 2022-07-31 NOTE — ER
Nurse's Notes                                                                                     

 [facility]                                                                 

Name: Ramírez Raza                                                                              

Age: 71 yrs                                                                                       

Sex: Male                                                                                         

: 1950                                                                                   

MRN: [id number]                                                                                   

Arrival Date: 2022                                                                          

Time: 20:14                                                                                       

Account#: [id number]                                                                            

Bed 15                                                                                            

Private MD:                                                                                       

Diagnosis: SARS-associated coronavirus as the cause of diseases classified elsewhere              

                                                                                                  

Presentation:                                                                                     

                                                                                             

20:19 Chief complaint: EMS states: Per wife patient found standing in restroom lethargic,     ke1 

      Temp 102.8 on EMS arrival. Wife tested recently positive for covid. Coronavirus screen:     

      Vaccine status: Patient reports receiving the 2nd dose of the covid vaccine. Ebola          

      Screen: No symptoms or risks identified at this time. Initial Sepsis Screen: Does the       

      patient meet any 2 criteria? RR > 20 per min. Temp <36.0*C (96.8*F)) or > 38.3*C            

      (100.9*F). Yes Does the patient have a suspected source of infection? No. Patient's         

      initial sepsis screen is negative. Risk Assessment: Do you want to hurt yourself or         

      someone else? Patient reports no desire to harm self or others. Onset of symptoms was       

      2022 at 19:45.                                                                     

20:19 Method Of Arrival: EMS                                                                  ke1 

20:19 Acuity: SAL 3                                                                           ke1 

                                                                                                  

Triage Assessment:                                                                                

20:25 General: Appears in no apparent distress. Behavior is appropriate for age. Pain: Denies ke1 

      pain.                                                                                       

21:09 Neuro: Level of Consciousness is awake, lethargic, Oriented to person, place, time,     ke1 

      situation. Respiratory: Respiratory effort is even, unlabored, Respiratory pattern is       

      regular, symmetrical.                                                                       

                                                                                                  

Historical:                                                                                       

- Allergies:                                                                                      

20:15 No Known Allergies;                                                                     ke1 

- PMHx:                                                                                           

20:15 Diabetes - NIDDM; hemorrhoids; High Cholesterol; Hypertension;                          ke1 

                                                                                                  

- Immunization history:: Adult Immunizations.                                                     

- Social history:: Smoking status: Patient reports the use of cigarette tobacco                   

  products, denies chronic smoking, but will smoke occasionally, cigars, Patient uses.            

                                                                                                  

                                                                                                  

Screenin:24 Abuse screen: Denies threats or abuse. Nutritional screening: No deficits noted.        ke1 

      Tuberculosis screening: No symptoms or risk factors identified.                             

21:09 Fall Risk No fall in past 12 months (0 pts). No secondary diagnosis (0 pts). IV access  ke1 

      (20 points). Ambulatory Aid- None/Bed Rest/Nurse Assist (0 pts). Gait- Normal/Bed           

      Rest/Wheelchair (0 pts) Mental Status- Oriented to own ability (0 pts). Total Michelle         

      Fall Scale indicates.                                                                       

                                                                                                  

Assessment:                                                                                       

21:27 Reassessment: No changes from previously documented assessment.                         ke1 

                                                                                                  

Vital Signs:                                                                                      

20:19  / 65; Pulse 81; Resp 25; Temp 101(O); Pulse Ox 97% on R/A; Weight 90.72 kg;      ke1 

      Height 5 ft. 10 in. (177.80 cm); Pain 0/10;                                                 

21:27  / 60; Pulse 82; Temp 99.9(O); Pulse Ox 96% on R/A;                               ke1 

22:27  / 77; Pulse 79; Resp 21; Temp 98.9(O); Pulse Ox 95% on R/A; Pain 0/10;           ke1 

20:19 Body Mass Index 28.70 (90.72 kg, 177.80 cm)                                             ke1 

                                                                                                  

ED Course:                                                                                        

20:14 Patient arrived in ED.                                                                  mw2 

20:15 Ellie Acosta, DANIEL is Primary Nurse.                                                 ke1 

20:16 [facility]                                                        kb  

20:16 Devyn Alamo MD is Attending Physician.                                              kb  

20:24 Triage completed.                                                                       ke1 

20:38 EKG done, by ED staff, reviewed by Sade STONE COVID swab sent to lab.        mh5 

20:39 Patient has correct armband on for positive identification. Placed in gown. Bed in low  mh5 

      position. Call light in reach. Side rails up X2. Pillow given.                              

20:46 Chest Single View XRAY In Process Unspecified.                                          EDMS

21:00 Inserted saline lock: 20 gauge in right antecubital area, using aseptic technique.      ke1 

23:08 No provider procedures requiring assistance completed.                                  ke1 

23:15 IV discontinued.                                                                        ke1 

                                                                                                  

Administered Medications:                                                                         

21:04 Drug: Tylenol 1000 mg Route: PO;                                                        ke1 

22:45 Follow up: Response: Marked relief of symptoms                                          ke1 

23:04 Not Given (unavailablee): Bebtelovimab 175 mg IV at calculated rate once; as a single   ke1 

      dose                                                                                        

                                                                                                  

                                                                                                  

Medication:                                                                                       

23:08 VIS not applicable for this client.                                                     ke1 

                                                                                                  

Intake:                                                                                           

21:15 PO: 0ml; Total: 0ml.                                                                    ke1 

                                                                                                  

Output:                                                                                           

21:15 Urine: 0ml; Total: 0ml.                                                                 ke1 

                                                                                                  

Outcome:                                                                                          

23:01 Discharge ordered by MD. patel  

23:15 Discharged to home ambulatory, with family, with wife                                   ke1 

23:15 Condition: good                                                                             

23:15 Discharge instructions given to patient.                                                    

23:50 Patient left the ED.                                                                    ke1 

                                                                                                  

Signatures:                                                                                       

Dispatcher [employer]                           EDSade Aponte, FNP-C                 FNP-Diane Gaitan                              [facility]                                                  

Anny Silveira                            2                                                  

Ellie Acosta RN                   RN   ke1                                                  

                                                                                                  

Corrections: (The following items were deleted from the chart)                                    

21:10 20:25 Pain: Denies pain. ke1                                                            ke1 

23:09 20:39 Cardiac monitor on. Pulse ox on. NIBP on. [facility]                                     ke1 

                                                                                                  

**************************************************************************************************

## 2022-08-01 VITALS — DIASTOLIC BLOOD PRESSURE: 77 MMHG | OXYGEN SATURATION: 95 % | SYSTOLIC BLOOD PRESSURE: 150 MMHG | TEMPERATURE: 98.9 F

## 2022-08-01 NOTE — EKG
Test Date:    2022-07-31               Test Time:    20:35:33

Technician:   DOUGLAS                                    

                                                     

MEASUREMENT RESULTS:                                       

Intervals:                                           

Rate:         81                                     

AZ:           200                                    

QRSD:         86                                     

QT:           372                                    

QTc:          432                                    

Axis:                                                

P:            88                                     

AZ:           200                                    

QRS:          76                                     

T:            89                                     

                                                     

INTERPRETIVE STATEMENTS:                                       

                                                     

Normal sinus rhythm

ST abnormality, possible digitalis effect

Abnormal ECG

Compared to ECG 01/02/2021 10:44:02

ST (T wave) deviation now present

Right-axis deviation no longer present



Electronically Signed On 08-01-22 12:19:54 CDT by Ellis Jorge